# Patient Record
Sex: MALE | Race: WHITE | HISPANIC OR LATINO | URBAN - METROPOLITAN AREA
[De-identification: names, ages, dates, MRNs, and addresses within clinical notes are randomized per-mention and may not be internally consistent; named-entity substitution may affect disease eponyms.]

---

## 2024-11-11 ENCOUNTER — APPOINTMENT (RX ONLY)
Dept: URBAN - METROPOLITAN AREA CLINIC 12 | Facility: CLINIC | Age: 49
Setting detail: DERMATOLOGY
End: 2024-11-11

## 2024-11-11 PROBLEM — C49.9 MALIGNANT NEOPLASM OF CONNECTIVE AND SOFT TISSUE, UNSPECIFIED: Status: ACTIVE | Noted: 2024-11-11

## 2024-11-11 PROCEDURE — ? CONSULTATION FOR SURGICAL REPAIR

## 2024-11-11 PROCEDURE — ? CODE 99024 - NO CHARGE POST-OP VISIT

## 2024-11-11 PROCEDURE — 99203 OFFICE O/P NEW LOW 30 MIN: CPT

## 2024-11-11 PROCEDURE — ? PATIENT SPECIFIC COUNSELING

## 2024-11-11 NOTE — PROCEDURE: PATIENT SPECIFIC COUNSELING
Detail Level: Simple
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 Hernandez.\\n\\Shiva Chanel examined the patient and voiced:\\n- repair procedure may involve a skin graft \\n- wound vac will be applied to expedite healing \\n- pt will have to wear a boot or a splint in the right leg; pt will be unable to use the right leg or put weight on it without using a crutch\\n- pt advised to take collagen peptides: 2 scoops at morning and 2 scoops at night\\n- pt was given Wound healing vitamins sticker \\n\\nRTC first week of January \\nPt voiced understanding

## 2025-01-13 ENCOUNTER — APPOINTMENT (OUTPATIENT)
Dept: URBAN - METROPOLITAN AREA CLINIC 12 | Facility: CLINIC | Age: 50
Setting detail: DERMATOLOGY
End: 2025-01-13

## 2025-01-13 DIAGNOSIS — D48.5 NEOPLASM OF UNCERTAIN BEHAVIOR OF SKIN: ICD-10-CM

## 2025-01-13 PROCEDURE — ? PRE-OP WORKLIST

## 2025-01-13 ASSESSMENT — LOCATION ZONE DERM: LOCATION ZONE: LEG

## 2025-01-13 ASSESSMENT — LOCATION DETAILED DESCRIPTION DERM: LOCATION DETAILED: RIGHT DISTAL CALF

## 2025-01-13 ASSESSMENT — LOCATION SIMPLE DESCRIPTION DERM: LOCATION SIMPLE: RIGHT CALF

## 2025-01-13 NOTE — PROCEDURE: PRE-OP WORKLIST
Date Of Surgery: 1/23/25
Surgeon: Dr. Stuart Chanel
Coordination With:: Dr. Schneider
Surgery Scheduled: Right distal calf reconstruction + split thickness skin graft vs local flap and Miguel Angel wound vac placement
Detail Level: Simple

## 2025-02-03 ENCOUNTER — APPOINTMENT (OUTPATIENT)
Dept: URBAN - METROPOLITAN AREA CLINIC 12 | Facility: CLINIC | Age: 50
Setting detail: DERMATOLOGY
End: 2025-02-03

## 2025-02-03 DIAGNOSIS — Z48.89 ENCOUNTER FOR OTHER SPECIFIED SURGICAL AFTERCARE: ICD-10-CM

## 2025-02-03 PROCEDURE — ? DRAIN REMOVAL

## 2025-02-03 PROCEDURE — ? PATIENT SPECIFIC COUNSELING

## 2025-02-03 PROCEDURE — 99024 POSTOP FOLLOW-UP VISIT: CPT

## 2025-02-03 PROCEDURE — ? COUNSELING - POST-OP CHECK

## 2025-02-03 PROCEDURE — ? POST-OP CHECK

## 2025-02-03 ASSESSMENT — LOCATION SIMPLE DESCRIPTION DERM
LOCATION SIMPLE: LEFT PRETIBIAL REGION
LOCATION SIMPLE: RIGHT PRETIBIAL REGION

## 2025-02-03 ASSESSMENT — LOCATION DETAILED DESCRIPTION DERM
LOCATION DETAILED: RIGHT PROXIMAL PRETIBIAL REGION
LOCATION DETAILED: LEFT PROXIMAL PRETIBIAL REGION

## 2025-02-03 ASSESSMENT — LOCATION ZONE DERM
LOCATION ZONE: LEG
LOCATION ZONE: LEG

## 2025-02-03 NOTE — PROCEDURE: POST-OP CHECK
Detail Level: Simple
Add Postop Global No-Charge Code (00270)?: yes
Wound Evaluated By: Dr. Stuart Chanel

## 2025-02-03 NOTE — PROCEDURE: PATIENT SPECIFIC COUNSELING
Other (Free Text): Patient presents s/p Right distal calf reconstruction + split thickness skin graft vs local flap and Miguel Angel wound vac performed on 1/23/25. Pt voiced doing well, no concerns.\\n\\nDrKristy Chanel examined the patient and voiced:\\n- pt is healing well, so signs of infection, hematoma, or Seroma \\n- drain is ready to be removed \\n- avoid wetting the right calf area, gently wash the area with a washcloth if needed\\n- advised pt to wear compression socks to reduce residual swelling\\n- pt should use a crutch or a cane while walking to avoid putting excessive weight on the right leg \\n\\nRTC 2 weeks for S/R\\nPt voiced understanding
Detail Level: Simple

## 2025-02-17 ENCOUNTER — RX ONLY (RX ONLY)
Age: 50
End: 2025-02-17

## 2025-02-17 ENCOUNTER — APPOINTMENT (OUTPATIENT)
Dept: URBAN - METROPOLITAN AREA CLINIC 12 | Facility: CLINIC | Age: 50
Setting detail: DERMATOLOGY
End: 2025-02-17

## 2025-02-17 DIAGNOSIS — Z48.89 ENCOUNTER FOR OTHER SPECIFIED SURGICAL AFTERCARE: ICD-10-CM

## 2025-02-17 PROCEDURE — 99024 POSTOP FOLLOW-UP VISIT: CPT

## 2025-02-17 PROCEDURE — 97597 DBRDMT OPN WND 1ST 20 CM/<: CPT

## 2025-02-17 PROCEDURE — ? POST-OP CHECK

## 2025-02-17 PROCEDURE — ? COUNSELING - POST-OP CHECK

## 2025-02-17 PROCEDURE — ? PATIENT SPECIFIC COUNSELING

## 2025-02-17 PROCEDURE — 97602 WOUND(S) CARE NON-SELECTIVE: CPT | Mod: 59

## 2025-02-17 PROCEDURE — ? DEBRIDEMENT OF OPEN WOUND

## 2025-02-17 RX ORDER — DOXYCYCLINE HYCLATE 100 MG/1
1 TABLET CAPSULE, GELATIN COATED ORAL
Qty: 14 | Refills: 0 | Status: ERX | COMMUNITY
Start: 2025-02-17

## 2025-02-17 RX ORDER — MUPIROCIN 20 MG/G
OINTMENT TOPICAL ONCE A DAY
Qty: 44 | Refills: 3 | Status: ERX | COMMUNITY
Start: 2025-02-17

## 2025-02-17 ASSESSMENT — LOCATION ZONE DERM
LOCATION ZONE: LEG
LOCATION ZONE: LEG

## 2025-02-17 ASSESSMENT — LOCATION DETAILED DESCRIPTION DERM
LOCATION DETAILED: RIGHT DISTAL CALF
LOCATION DETAILED: LEFT PROXIMAL PRETIBIAL REGION
LOCATION DETAILED: LEFT PROXIMAL PRETIBIAL REGION
LOCATION DETAILED: RIGHT PROXIMAL PRETIBIAL REGION

## 2025-02-17 ASSESSMENT — LOCATION SIMPLE DESCRIPTION DERM
LOCATION SIMPLE: LEFT PRETIBIAL REGION
LOCATION SIMPLE: RIGHT PRETIBIAL REGION
LOCATION SIMPLE: LEFT PRETIBIAL REGION
LOCATION SIMPLE: RIGHT CALF

## 2025-02-17 NOTE — PROCEDURE: POST-OP CHECK
Detail Level: Simple
Add Postop Global No-Charge Code (46676)?: yes
Wound Evaluated By: Dr. Stuart Chanel

## 2025-02-17 NOTE — PROCEDURE: DEBRIDEMENT OF OPEN WOUND
Detail Level: Detailed
Procedure: Debridement of wound tissue less than 20sq cm
Size Of Wound In Sq Cm: 3x3
Bill For Wasted Drug?: no
Skin Substitute Units: 0
Procedure: Non-selective debridement with dressings

## 2025-02-17 NOTE — PROCEDURE: PATIENT SPECIFIC COUNSELING
Other (Free Text): Patient presents s/p Right distal calf reconstruction + split thickness skin graft vs local flap and Miguel Angel wound vac performed on 1/23/25. Pt voiced doing well, no concerns.\\n\\Shiva Chanel examined the patient and voiced:\\n- there is a small open wound that will take time to heal due to the amount
Detail Level: Simple

## 2025-02-21 ENCOUNTER — APPOINTMENT (OUTPATIENT)
Dept: URBAN - METROPOLITAN AREA CLINIC 12 | Facility: CLINIC | Age: 50
Setting detail: DERMATOLOGY
End: 2025-02-21

## 2025-02-21 DIAGNOSIS — Z48.89 ENCOUNTER FOR OTHER SPECIFIED SURGICAL AFTERCARE: ICD-10-CM

## 2025-02-21 PROCEDURE — ? PATIENT SPECIFIC COUNSELING

## 2025-02-21 PROCEDURE — ? POST-OP CHECK

## 2025-02-21 PROCEDURE — ? COUNSELING - POST-OP CHECK

## 2025-02-21 PROCEDURE — 99024 POSTOP FOLLOW-UP VISIT: CPT

## 2025-02-21 ASSESSMENT — LOCATION SIMPLE DESCRIPTION DERM
LOCATION SIMPLE: LEFT PRETIBIAL REGION
LOCATION SIMPLE: RIGHT PRETIBIAL REGION

## 2025-02-21 ASSESSMENT — LOCATION ZONE DERM
LOCATION ZONE: LEG
LOCATION ZONE: LEG

## 2025-02-21 NOTE — PROCEDURE: POST-OP CHECK
Detail Level: Simple
Add Postop Global No-Charge Code (50069)?: yes
Wound Evaluated By: Dr. Stuart Chanel

## 2025-02-21 NOTE — PROCEDURE: PATIENT SPECIFIC COUNSELING
Other (Free Text): Patient presents s/p Right distal calf reconstruction + split thickness skin graft vs local flap and Miguel Angel wound vac performed on 1/23/25. Pt voiced doing well, no concerns.\\n\\nDrKritsy Chanel examined the patient and voiced:\\nEverything is healing well\\nChange the dressing once a day until you see the wound clinic \\nDress it with mupricin xeroforms and telfa \\nVisit the wound clinic next week
Detail Level: Simple